# Patient Record
Sex: FEMALE | Race: WHITE | Employment: OTHER | URBAN - METROPOLITAN AREA
[De-identification: names, ages, dates, MRNs, and addresses within clinical notes are randomized per-mention and may not be internally consistent; named-entity substitution may affect disease eponyms.]

---

## 2018-12-13 ENCOUNTER — HOSPITAL ENCOUNTER (OUTPATIENT)
Dept: PREADMISSION TESTING | Age: 73
Discharge: HOME | End: 2018-12-13
Payer: MEDICARE

## 2018-12-13 LAB
ALBUMIN SERPL-MCNC: 4.2 G/DL (ref 3.4–5)
ALBUMIN/GLOB SERPL: 1.2 {RATIO} (ref 0.8–1.7)
ALP SERPL-CCNC: 131 U/L (ref 45–117)
ALT SERPL-CCNC: 32 U/L (ref 13–56)
ANION GAP SERPL CALC-SCNC: 8 MMOL/L (ref 3–18)
APPEARANCE UR: CLEAR
APTT PPP: 29.8 SEC (ref 23–36.4)
AST SERPL-CCNC: 24 U/L (ref 15–37)
ATRIAL RATE: 55 BPM
BACTERIA SPEC CULT: NORMAL
BACTERIA URNS QL MICRO: ABNORMAL /HPF
BASOPHILS # BLD: 0 K/UL (ref 0–0.1)
BASOPHILS NFR BLD: 0 % (ref 0–2)
BILIRUB SERPL-MCNC: 0.4 MG/DL (ref 0.2–1)
BILIRUB UR QL: NEGATIVE
BUN SERPL-MCNC: 20 MG/DL (ref 7–18)
BUN/CREAT SERPL: 22
CALCIUM SERPL-MCNC: 8.8 MG/DL (ref 8.5–10.1)
CALCULATED P AXIS, ECG09: 72 DEGREES
CALCULATED R AXIS, ECG10: 25 DEGREES
CALCULATED T AXIS, ECG11: 37 DEGREES
CHLORIDE SERPL-SCNC: 105 MMOL/L (ref 100–108)
CO2 SERPL-SCNC: 27 MMOL/L (ref 21–32)
COLOR UR: YELLOW
CREAT SERPL-MCNC: 0.89 MG/DL (ref 0.6–1.3)
DIAGNOSIS, 93000: NORMAL
DIFFERENTIAL METHOD BLD: ABNORMAL
EOSINOPHIL # BLD: 0.3 K/UL (ref 0–0.4)
EOSINOPHIL NFR BLD: 2 % (ref 0–5)
EPITH CASTS URNS QL MICRO: ABNORMAL /LPF (ref 0–5)
ERYTHROCYTE [DISTWIDTH] IN BLOOD BY AUTOMATED COUNT: 14.5 % (ref 11.6–14.5)
ERYTHROCYTE [SEDIMENTATION RATE] IN BLOOD: 22 MM/HR (ref 0–30)
EST. AVERAGE GLUCOSE BLD GHB EST-MCNC: 134 MG/DL
GLOBULIN SER CALC-MCNC: 3.6 G/DL (ref 2–4)
GLUCOSE SERPL-MCNC: 64 MG/DL (ref 74–99)
GLUCOSE UR STRIP.AUTO-MCNC: NEGATIVE MG/DL
HBA1C MFR BLD: 6.3 % (ref 4.2–5.6)
HCT VFR BLD AUTO: 37.7 % (ref 35–45)
HGB BLD-MCNC: 12.5 G/DL (ref 12–16)
HGB UR QL STRIP: NEGATIVE
INR PPP: 1 (ref 0.8–1.2)
KETONES UR QL STRIP.AUTO: NEGATIVE MG/DL
LEUKOCYTE ESTERASE UR QL STRIP.AUTO: ABNORMAL
LYMPHOCYTES # BLD: 4.8 K/UL (ref 0.9–3.6)
LYMPHOCYTES NFR BLD: 37 % (ref 21–52)
MCH RBC QN AUTO: 29.7 PG (ref 24–34)
MCHC RBC AUTO-ENTMCNC: 33.2 G/DL (ref 31–37)
MCV RBC AUTO: 89.5 FL (ref 74–97)
MONOCYTES # BLD: 0.9 K/UL (ref 0.05–1.2)
MONOCYTES NFR BLD: 7 % (ref 3–10)
NEUTS SEG # BLD: 7.1 K/UL (ref 1.8–8)
NEUTS SEG NFR BLD: 54 % (ref 40–73)
NITRITE UR QL STRIP.AUTO: NEGATIVE
P-R INTERVAL, ECG05: 142 MS
PH UR STRIP: 7 [PH] (ref 5–8)
PLATELET # BLD AUTO: 306 K/UL (ref 135–420)
PMV BLD AUTO: 10 FL (ref 9.2–11.8)
POTASSIUM SERPL-SCNC: 3.9 MMOL/L (ref 3.5–5.5)
PROT SERPL-MCNC: 7.8 G/DL (ref 6.4–8.2)
PROT UR STRIP-MCNC: NEGATIVE MG/DL
PROTHROMBIN TIME: 12.5 SEC (ref 11.5–15.2)
Q-T INTERVAL, ECG07: 472 MS
QRS DURATION, ECG06: 94 MS
QTC CALCULATION (BEZET), ECG08: 451 MS
RBC # BLD AUTO: 4.21 M/UL (ref 4.2–5.3)
RBC #/AREA URNS HPF: NEGATIVE /HPF (ref 0–5)
SERVICE CMNT-IMP: NORMAL
SODIUM SERPL-SCNC: 140 MMOL/L (ref 136–145)
SP GR UR REFRACTOMETRY: 1.01 (ref 1–1.03)
UROBILINOGEN UR QL STRIP.AUTO: 0.2 EU/DL (ref 0.2–1)
VENTRICULAR RATE, ECG03: 55 BPM
WBC # BLD AUTO: 13 K/UL (ref 4.6–13.2)
WBC URNS QL MICRO: ABNORMAL /HPF (ref 0–5)

## 2018-12-13 PROCEDURE — 87641 MR-STAPH DNA AMP PROBE: CPT

## 2018-12-13 PROCEDURE — 80053 COMPREHEN METABOLIC PANEL: CPT

## 2018-12-13 PROCEDURE — 85652 RBC SED RATE AUTOMATED: CPT

## 2018-12-13 PROCEDURE — 81001 URINALYSIS AUTO W/SCOPE: CPT

## 2018-12-13 PROCEDURE — 85025 COMPLETE CBC W/AUTO DIFF WBC: CPT

## 2018-12-13 PROCEDURE — 83036 HEMOGLOBIN GLYCOSYLATED A1C: CPT

## 2018-12-13 PROCEDURE — 93005 ELECTROCARDIOGRAM TRACING: CPT

## 2018-12-13 PROCEDURE — 85610 PROTHROMBIN TIME: CPT

## 2018-12-13 PROCEDURE — 36415 COLL VENOUS BLD VENIPUNCTURE: CPT

## 2018-12-13 PROCEDURE — 85730 THROMBOPLASTIN TIME PARTIAL: CPT

## 2018-12-13 NOTE — ROUTINE PROCESS
Kenyon Damian attended the Joint Replacement Pre-Operative class on 12.13.2018. Topics discussed included surgery preparation, what to expect the day of surgery, medications, nutrition, glycemic control, respiratory therapy, physical and occupational therapy, and discharge planning. A patient education notebook was provided and the opportunity was given to ask questions and phone number of the Orthopaedic  was given for any future questions or concerns. [place]

## 2019-01-06 ENCOUNTER — ANESTHESIA EVENT (OUTPATIENT)
Dept: SURGERY | Age: 74
DRG: 470 | End: 2019-01-06
Payer: MEDICARE

## 2019-01-06 PROBLEM — M16.11 LOCALIZED OSTEOARTHROSIS OF RIGHT HIP: Chronic | Status: ACTIVE | Noted: 2019-01-06

## 2019-01-06 NOTE — H&P
[address] History and Physical Exam 
 
Patient: Kenyon Damian MRN: [id number]  SSN: [id number] YOB: 1945  Age: 74 y.o. Sex: female Subjective: Chief Complaint: right hip pain History of Present Illness:  Patient complains of right hip pain and difficulty ambulating, which has progressed over the past several months. X-rays showed osteoarthritis of the joint. The patient's pain has persisted and progressed despite conservative treatments and therapies. The patient has been previously treated with NSAIDS. The patient has at this time opted for surgical intervention. Past Medical History:  
Diagnosis Date  Adverse effect of anesthesia   
 difficult to wake up  Hypertension   Localized osteoarthrosis of right hip 2019  Neuropathy   
 toes  OA (osteoarthritis)  Thyroid disease   
 hypo Past Surgical History:  
Procedure Laterality Date  HX GI  before   
 colonoscopy  HX GI    
 ERCP  
 HX LAP CHOLECYSTECTOMY  HX TONSILLECTOMY  HX TUBAL LIGATION Bilateral   
 
Social History Occupational History  Not on file Tobacco Use  Smoking status: Former Smoker Last attempt to quit: 1970 Years since quittin.0  Smokeless tobacco: Never Used Substance and Sexual Activity  Alcohol use: No  
  Frequency: Never  Drug use: No  
 Sexual activity: Not on file Prior to Admission medications Medication Sig Start Date End Date Taking? Authorizing Provider  
levothyroxine (SYNTHROID) 137 mcg tablet Take  by mouth Daily (before breakfast). Provider, Historical  
diltiazem (TIAZAC) 240 mg SR capsule Take 240 mg by mouth daily. Provider, Historical  
losartan (COZAAR) 50 mg tablet Take 50 mg by mouth daily. Provider, Historical  
multivitamin (ONE A DAY) tablet Take 1 Tab by mouth daily. Provider, Historical  
 
 
Allergies: Allergies Allergen Reactions  Pcn [Penicillins] Anaphylaxis 2011  Shellfish Derived Anaphylaxis 2011  Sulfa (Sulfonamide Antibiotics) Itching Review of Systems: 
Pertinent items are noted in the History of Present Illness. Objective:   
  
Physical Exam: 
HEENT: Normocephalic, atraumatic Lungs:  Clear to auscultation Heart:   Regular rate and rhythm Abdomen: Soft Extremities:  Pain with range of motion of the right hip. Passive flexion  degrees, 
                     passive internal rotation 0-10 degrees, with pain throughout ROM,  
                     passive external rotation 10-20 degrees with pain at the arc of motion. Antalgic gait noted. Assessment:   
 
Arthritis of the right hip. Plan:    
 
Proceed with scheduled RIGHT TOTAL HIP ARTHROPLASTY. The various methods of treatment have been discussed with the patient and family. After consideration of risks, benefits, and other options for treatment, the patient has consented to surgical interventions. Questions were answered and preoperative teaching was done by Dr Delilah Green. Signed By: KAREN Espinal   
 January 6, 2019

## 2019-01-07 ENCOUNTER — HOSPITAL ENCOUNTER (INPATIENT)
Age: 74
LOS: 1 days | Discharge: HOME HEALTH | DRG: 470 | End: 2019-01-08
Attending: ORTHOPAEDIC SURGERY | Admitting: ORTHOPAEDIC SURGERY
Payer: MEDICARE

## 2019-01-07 ENCOUNTER — APPOINTMENT (OUTPATIENT)
Dept: GENERAL RADIOLOGY | Age: 74
DRG: 470 | End: 2019-01-07
Attending: ORTHOPAEDIC SURGERY
Payer: MEDICARE

## 2019-01-07 ENCOUNTER — APPOINTMENT (OUTPATIENT)
Dept: GENERAL RADIOLOGY | Age: 74
DRG: 470 | End: 2019-01-07
Attending: PHYSICIAN ASSISTANT
Payer: MEDICARE

## 2019-01-07 ENCOUNTER — ANESTHESIA (OUTPATIENT)
Dept: SURGERY | Age: 74
DRG: 470 | End: 2019-01-07
Payer: MEDICARE

## 2019-01-07 DIAGNOSIS — M16.11 PRIMARY OSTEOARTHRITIS OF RIGHT HIP: Primary | ICD-10-CM

## 2019-01-07 LAB
ABO + RH BLD: NORMAL
APPEARANCE UR: CLEAR
BACTERIA URNS QL MICRO: ABNORMAL /HPF
BILIRUB UR QL: NEGATIVE
BLOOD GROUP ANTIBODIES SERPL: NORMAL
CAOX CRY URNS QL MICRO: ABNORMAL
COLOR UR: YELLOW
EPITH CASTS URNS QL MICRO: ABNORMAL /LPF (ref 0–5)
GLUCOSE BLD STRIP.AUTO-MCNC: 101 MG/DL (ref 70–110)
GLUCOSE UR STRIP.AUTO-MCNC: NEGATIVE MG/DL
HGB UR QL STRIP: NEGATIVE
KETONES UR QL STRIP.AUTO: NEGATIVE MG/DL
LEUKOCYTE ESTERASE UR QL STRIP.AUTO: ABNORMAL
NITRITE UR QL STRIP.AUTO: NEGATIVE
PH UR STRIP: 6 [PH] (ref 5–8)
PROT UR STRIP-MCNC: NEGATIVE MG/DL
RBC #/AREA URNS HPF: ABNORMAL /HPF (ref 0–5)
SP GR UR REFRACTOMETRY: 1.02 (ref 1–1.03)
SPECIMEN EXP DATE BLD: NORMAL
UROBILINOGEN UR QL STRIP.AUTO: 0.2 EU/DL (ref 0.2–1)
WBC URNS QL MICRO: ABNORMAL /HPF (ref 0–5)

## 2019-01-07 PROCEDURE — 77030031139 HC SUT VCRL2 J&J -A: Performed by: ORTHOPAEDIC SURGERY

## 2019-01-07 PROCEDURE — 77030002934 HC SUT MCRYL J&J -B: Performed by: ORTHOPAEDIC SURGERY

## 2019-01-07 PROCEDURE — 36415 COLL VENOUS BLD VENIPUNCTURE: CPT

## 2019-01-07 PROCEDURE — 82962 GLUCOSE BLOOD TEST: CPT

## 2019-01-07 PROCEDURE — 81001 URINALYSIS AUTO W/SCOPE: CPT

## 2019-01-07 PROCEDURE — 74011000250 HC RX REV CODE- 250

## 2019-01-07 PROCEDURE — 77030013708 HC HNDPC SUC IRR PULS STRY –B: Performed by: ORTHOPAEDIC SURGERY

## 2019-01-07 PROCEDURE — 77030032490 HC SLV COMPR SCD KNE COVD -B: Performed by: ORTHOPAEDIC SURGERY

## 2019-01-07 PROCEDURE — 77030020782 HC GWN BAIR PAWS FLX 3M -B: Performed by: ORTHOPAEDIC SURGERY

## 2019-01-07 PROCEDURE — 74011250637 HC RX REV CODE- 250/637: Performed by: ANESTHESIOLOGY

## 2019-01-07 PROCEDURE — 76060000033 HC ANESTHESIA 1 TO 1.5 HR: Performed by: ORTHOPAEDIC SURGERY

## 2019-01-07 PROCEDURE — 74011250637 HC RX REV CODE- 250/637: Performed by: PHYSICIAN ASSISTANT

## 2019-01-07 PROCEDURE — 77030012890

## 2019-01-07 PROCEDURE — 77030034694 HC SCPL CANADY PLSM DISP USMD -E: Performed by: ORTHOPAEDIC SURGERY

## 2019-01-07 PROCEDURE — 97116 GAIT TRAINING THERAPY: CPT

## 2019-01-07 PROCEDURE — 65270000029 HC RM PRIVATE

## 2019-01-07 PROCEDURE — 77030038010: Performed by: ORTHOPAEDIC SURGERY

## 2019-01-07 PROCEDURE — 74011250636 HC RX REV CODE- 250/636

## 2019-01-07 PROCEDURE — C1776 JOINT DEVICE (IMPLANTABLE): HCPCS | Performed by: ORTHOPAEDIC SURGERY

## 2019-01-07 PROCEDURE — 74011250637 HC RX REV CODE- 250/637: Performed by: ORTHOPAEDIC SURGERY

## 2019-01-07 PROCEDURE — 74011250636 HC RX REV CODE- 250/636: Performed by: ORTHOPAEDIC SURGERY

## 2019-01-07 PROCEDURE — 76010000149 HC OR TIME 1 TO 1.5 HR: Performed by: ORTHOPAEDIC SURGERY

## 2019-01-07 PROCEDURE — 77030027138 HC INCENT SPIROMETER -A: Performed by: ORTHOPAEDIC SURGERY

## 2019-01-07 PROCEDURE — 74011000250 HC RX REV CODE- 250: Performed by: ORTHOPAEDIC SURGERY

## 2019-01-07 PROCEDURE — 77030003666 HC NDL SPINAL BD -A: Performed by: ORTHOPAEDIC SURGERY

## 2019-01-07 PROCEDURE — 0SR904A REPLACEMENT OF RIGHT HIP JOINT WITH CERAMIC ON POLYETHYLENE SYNTHETIC SUBSTITUTE, UNCEMENTED, OPEN APPROACH: ICD-10-PCS | Performed by: ORTHOPAEDIC SURGERY

## 2019-01-07 PROCEDURE — 76210000016 HC OR PH I REC 1 TO 1.5 HR: Performed by: ORTHOPAEDIC SURGERY

## 2019-01-07 PROCEDURE — 86900 BLOOD TYPING SEROLOGIC ABO: CPT

## 2019-01-07 PROCEDURE — 74011250636 HC RX REV CODE- 250/636: Performed by: PHYSICIAN ASSISTANT

## 2019-01-07 PROCEDURE — 77030033263 HC DRSG MEPILEX 16-48IN BORD MOLN -B: Performed by: ORTHOPAEDIC SURGERY

## 2019-01-07 PROCEDURE — 77030039267 HC ADH SKN EXOFIN S2SG -B: Performed by: ORTHOPAEDIC SURGERY

## 2019-01-07 PROCEDURE — 77030018836 HC SOL IRR NACL ICUM -A: Performed by: ORTHOPAEDIC SURGERY

## 2019-01-07 PROCEDURE — 73501 X-RAY EXAM HIP UNI 1 VIEW: CPT

## 2019-01-07 PROCEDURE — 97161 PT EVAL LOW COMPLEX 20 MIN: CPT

## 2019-01-07 DEVICE — STEM FEM PRSS FT HIP HRD SURF: Type: IMPLANTABLE DEVICE | Site: HIP | Status: FUNCTIONAL

## 2019-01-07 RX ORDER — ONDANSETRON 2 MG/ML
INJECTION INTRAMUSCULAR; INTRAVENOUS AS NEEDED
Status: DISCONTINUED | OUTPATIENT
Start: 2019-01-07 | End: 2019-01-07 | Stop reason: HOSPADM

## 2019-01-07 RX ORDER — SODIUM CHLORIDE, SODIUM LACTATE, POTASSIUM CHLORIDE, CALCIUM CHLORIDE 600; 310; 30; 20 MG/100ML; MG/100ML; MG/100ML; MG/100ML
125 INJECTION, SOLUTION INTRAVENOUS CONTINUOUS
Status: DISCONTINUED | OUTPATIENT
Start: 2019-01-07 | End: 2019-01-08 | Stop reason: HOSPADM

## 2019-01-07 RX ORDER — DOCUSATE SODIUM 100 MG/1
100 CAPSULE, LIQUID FILLED ORAL DAILY
Status: DISCONTINUED | OUTPATIENT
Start: 2019-01-08 | End: 2019-01-08 | Stop reason: HOSPADM

## 2019-01-07 RX ORDER — ASPIRIN 81 MG/1
81 TABLET ORAL 2 TIMES DAILY
Status: DISCONTINUED | OUTPATIENT
Start: 2019-01-07 | End: 2019-01-08 | Stop reason: HOSPADM

## 2019-01-07 RX ORDER — MELOXICAM 7.5 MG/1
7.5 TABLET ORAL 2 TIMES DAILY
Qty: 28 TAB | Refills: 0 | Status: SHIPPED | OUTPATIENT
Start: 2019-01-07 | End: 2019-01-21

## 2019-01-07 RX ORDER — ZOLPIDEM TARTRATE 5 MG/1
5-10 TABLET ORAL
Status: DISCONTINUED | OUTPATIENT
Start: 2019-01-07 | End: 2019-01-08 | Stop reason: HOSPADM

## 2019-01-07 RX ORDER — OXYCODONE HYDROCHLORIDE 5 MG/1
5-10 TABLET ORAL
Status: DISCONTINUED | OUTPATIENT
Start: 2019-01-07 | End: 2019-01-08 | Stop reason: HOSPADM

## 2019-01-07 RX ORDER — MIDAZOLAM HYDROCHLORIDE 1 MG/ML
INJECTION, SOLUTION INTRAMUSCULAR; INTRAVENOUS AS NEEDED
Status: DISCONTINUED | OUTPATIENT
Start: 2019-01-07 | End: 2019-01-07 | Stop reason: HOSPADM

## 2019-01-07 RX ORDER — LIDOCAINE HYDROCHLORIDE 20 MG/ML
INJECTION, SOLUTION EPIDURAL; INFILTRATION; INTRACAUDAL; PERINEURAL AS NEEDED
Status: DISCONTINUED | OUTPATIENT
Start: 2019-01-07 | End: 2019-01-07 | Stop reason: HOSPADM

## 2019-01-07 RX ORDER — PANTOPRAZOLE SODIUM 40 MG/1
40 TABLET, DELAYED RELEASE ORAL DAILY
Status: DISCONTINUED | OUTPATIENT
Start: 2019-01-07 | End: 2019-01-08 | Stop reason: HOSPADM

## 2019-01-07 RX ORDER — DEXAMETHASONE SODIUM PHOSPHATE 4 MG/ML
4 INJECTION, SOLUTION INTRA-ARTICULAR; INTRALESIONAL; INTRAMUSCULAR; INTRAVENOUS; SOFT TISSUE ONCE
Status: COMPLETED | OUTPATIENT
Start: 2019-01-07 | End: 2019-01-07

## 2019-01-07 RX ORDER — DILTIAZEM HYDROCHLORIDE 240 MG/1
240 CAPSULE, COATED, EXTENDED RELEASE ORAL DAILY
Status: DISCONTINUED | OUTPATIENT
Start: 2019-01-08 | End: 2019-01-08 | Stop reason: HOSPADM

## 2019-01-07 RX ORDER — SODIUM CHLORIDE, SODIUM LACTATE, POTASSIUM CHLORIDE, CALCIUM CHLORIDE 600; 310; 30; 20 MG/100ML; MG/100ML; MG/100ML; MG/100ML
1000 INJECTION, SOLUTION INTRAVENOUS CONTINUOUS
Status: DISCONTINUED | OUTPATIENT
Start: 2019-01-07 | End: 2019-01-07 | Stop reason: HOSPADM

## 2019-01-07 RX ORDER — ASPIRIN 81 MG/1
81 TABLET ORAL 2 TIMES DAILY
Qty: 42 TAB | Refills: 0 | Status: SHIPPED | OUTPATIENT
Start: 2019-01-07 | End: 2019-01-07 | Stop reason: SDUPTHER

## 2019-01-07 RX ORDER — PANTOPRAZOLE SODIUM 40 MG/1
40 TABLET, DELAYED RELEASE ORAL DAILY
Status: DISCONTINUED | OUTPATIENT
Start: 2019-01-07 | End: 2019-01-07

## 2019-01-07 RX ORDER — OXYCODONE AND ACETAMINOPHEN 5; 325 MG/1; MG/1
TABLET ORAL
Qty: 60 TAB | Refills: 0 | Status: SHIPPED | OUTPATIENT
Start: 2019-01-07

## 2019-01-07 RX ORDER — TRANEXAMIC ACID 650 1/1
1950 TABLET ORAL ONCE
Status: COMPLETED | OUTPATIENT
Start: 2019-01-07 | End: 2019-01-07

## 2019-01-07 RX ORDER — NALOXONE HYDROCHLORIDE 0.4 MG/ML
0.2 INJECTION, SOLUTION INTRAMUSCULAR; INTRAVENOUS; SUBCUTANEOUS AS NEEDED
Status: DISCONTINUED | OUTPATIENT
Start: 2019-01-07 | End: 2019-01-07 | Stop reason: HOSPADM

## 2019-01-07 RX ORDER — SODIUM CHLORIDE 9 MG/ML
125 INJECTION, SOLUTION INTRAVENOUS CONTINUOUS
Status: DISPENSED | OUTPATIENT
Start: 2019-01-07 | End: 2019-01-08

## 2019-01-07 RX ORDER — ACETAMINOPHEN 325 MG/1
650 TABLET ORAL EVERY 6 HOURS
Status: DISCONTINUED | OUTPATIENT
Start: 2019-01-07 | End: 2019-01-08 | Stop reason: HOSPADM

## 2019-01-07 RX ORDER — ACETAMINOPHEN 500 MG
1000 TABLET ORAL
Status: COMPLETED | OUTPATIENT
Start: 2019-01-07 | End: 2019-01-07

## 2019-01-07 RX ORDER — SODIUM CHLORIDE 0.9 % (FLUSH) 0.9 %
5-10 SYRINGE (ML) INJECTION EVERY 8 HOURS
Status: DISCONTINUED | OUTPATIENT
Start: 2019-01-07 | End: 2019-01-08 | Stop reason: HOSPADM

## 2019-01-07 RX ORDER — CEFAZOLIN SODIUM/WATER 2 G/20 ML
2 SYRINGE (ML) INTRAVENOUS EVERY 8 HOURS
Status: COMPLETED | OUTPATIENT
Start: 2019-01-07 | End: 2019-01-08

## 2019-01-07 RX ORDER — DIPHENHYDRAMINE HYDROCHLORIDE 50 MG/ML
12.5 INJECTION, SOLUTION INTRAMUSCULAR; INTRAVENOUS
Status: DISCONTINUED | OUTPATIENT
Start: 2019-01-07 | End: 2019-01-07 | Stop reason: HOSPADM

## 2019-01-07 RX ORDER — KETAMINE HYDROCHLORIDE 10 MG/ML
INJECTION, SOLUTION INTRAMUSCULAR; INTRAVENOUS AS NEEDED
Status: DISCONTINUED | OUTPATIENT
Start: 2019-01-07 | End: 2019-01-07 | Stop reason: HOSPADM

## 2019-01-07 RX ORDER — DEXAMETHASONE SODIUM PHOSPHATE 4 MG/ML
8 INJECTION, SOLUTION INTRA-ARTICULAR; INTRALESIONAL; INTRAMUSCULAR; INTRAVENOUS; SOFT TISSUE ONCE
Status: DISCONTINUED | OUTPATIENT
Start: 2019-01-07 | End: 2019-01-07

## 2019-01-07 RX ORDER — ASPIRIN 81 MG/1
81 TABLET ORAL 2 TIMES DAILY
Qty: 42 TAB | Refills: 0 | Status: SHIPPED | OUTPATIENT
Start: 2019-01-07 | End: 2019-01-28

## 2019-01-07 RX ORDER — CEFAZOLIN SODIUM/WATER 2 G/20 ML
2 SYRINGE (ML) INTRAVENOUS ONCE
Status: COMPLETED | OUTPATIENT
Start: 2019-01-07 | End: 2019-01-07

## 2019-01-07 RX ORDER — KETOROLAC TROMETHAMINE 30 MG/ML
15 INJECTION, SOLUTION INTRAMUSCULAR; INTRAVENOUS EVERY 6 HOURS
Status: COMPLETED | OUTPATIENT
Start: 2019-01-07 | End: 2019-01-08

## 2019-01-07 RX ORDER — LOSARTAN POTASSIUM 50 MG/1
50 TABLET ORAL DAILY
Status: DISCONTINUED | OUTPATIENT
Start: 2019-01-08 | End: 2019-01-08 | Stop reason: HOSPADM

## 2019-01-07 RX ORDER — ONDANSETRON 2 MG/ML
4 INJECTION INTRAMUSCULAR; INTRAVENOUS
Status: DISCONTINUED | OUTPATIENT
Start: 2019-01-07 | End: 2019-01-08 | Stop reason: HOSPADM

## 2019-01-07 RX ORDER — DIPHENHYDRAMINE HYDROCHLORIDE 50 MG/ML
12.5 INJECTION, SOLUTION INTRAMUSCULAR; INTRAVENOUS
Status: DISCONTINUED | OUTPATIENT
Start: 2019-01-07 | End: 2019-01-08 | Stop reason: HOSPADM

## 2019-01-07 RX ORDER — SODIUM CHLORIDE 9 MG/ML
300 INJECTION, SOLUTION INTRAVENOUS CONTINUOUS
Status: DISPENSED | OUTPATIENT
Start: 2019-01-07 | End: 2019-01-07

## 2019-01-07 RX ORDER — VANCOMYCIN/0.9 % SOD CHLORIDE 1.5G/250ML
1500 PLASTIC BAG, INJECTION (ML) INTRAVENOUS ONCE
Status: DISCONTINUED | OUTPATIENT
Start: 2019-01-07 | End: 2019-01-07 | Stop reason: CLARIF

## 2019-01-07 RX ORDER — FENTANYL CITRATE 50 UG/ML
25 INJECTION, SOLUTION INTRAMUSCULAR; INTRAVENOUS AS NEEDED
Status: DISCONTINUED | OUTPATIENT
Start: 2019-01-07 | End: 2019-01-07 | Stop reason: HOSPADM

## 2019-01-07 RX ORDER — NALOXONE HYDROCHLORIDE 0.4 MG/ML
0.4 INJECTION, SOLUTION INTRAMUSCULAR; INTRAVENOUS; SUBCUTANEOUS AS NEEDED
Status: DISCONTINUED | OUTPATIENT
Start: 2019-01-07 | End: 2019-01-08 | Stop reason: HOSPADM

## 2019-01-07 RX ORDER — ALBUTEROL SULFATE 0.83 MG/ML
2.5 SOLUTION RESPIRATORY (INHALATION) AS NEEDED
Status: DISCONTINUED | OUTPATIENT
Start: 2019-01-07 | End: 2019-01-07 | Stop reason: HOSPADM

## 2019-01-07 RX ORDER — SODIUM CHLORIDE 0.9 % (FLUSH) 0.9 %
5-10 SYRINGE (ML) INJECTION AS NEEDED
Status: DISCONTINUED | OUTPATIENT
Start: 2019-01-07 | End: 2019-01-08 | Stop reason: HOSPADM

## 2019-01-07 RX ORDER — METOCLOPRAMIDE HYDROCHLORIDE 5 MG/ML
10 INJECTION INTRAMUSCULAR; INTRAVENOUS
Status: DISCONTINUED | OUTPATIENT
Start: 2019-01-07 | End: 2019-01-08 | Stop reason: HOSPADM

## 2019-01-07 RX ORDER — PROPOFOL 10 MG/ML
INJECTION, EMULSION INTRAVENOUS AS NEEDED
Status: DISCONTINUED | OUTPATIENT
Start: 2019-01-07 | End: 2019-01-07 | Stop reason: HOSPADM

## 2019-01-07 RX ORDER — HYDROMORPHONE HYDROCHLORIDE 2 MG/ML
0.5 INJECTION, SOLUTION INTRAMUSCULAR; INTRAVENOUS; SUBCUTANEOUS
Status: DISCONTINUED | OUTPATIENT
Start: 2019-01-07 | End: 2019-01-07 | Stop reason: HOSPADM

## 2019-01-07 RX ORDER — FLUMAZENIL 0.1 MG/ML
0.2 INJECTION INTRAVENOUS
Status: DISCONTINUED | OUTPATIENT
Start: 2019-01-07 | End: 2019-01-07 | Stop reason: HOSPADM

## 2019-01-07 RX ORDER — LANOLIN ALCOHOL/MO/W.PET/CERES
1 CREAM (GRAM) TOPICAL 3 TIMES DAILY
Status: DISCONTINUED | OUTPATIENT
Start: 2019-01-07 | End: 2019-01-08 | Stop reason: HOSPADM

## 2019-01-07 RX ADMIN — OXYCODONE HYDROCHLORIDE 10 MG: 5 TABLET ORAL at 10:46

## 2019-01-07 RX ADMIN — OXYCODONE HYDROCHLORIDE 5 MG: 5 TABLET ORAL at 19:00

## 2019-01-07 RX ADMIN — PROPOFOL 180 MG: 10 INJECTION, EMULSION INTRAVENOUS at 09:18

## 2019-01-07 RX ADMIN — SODIUM CHLORIDE, SODIUM LACTATE, POTASSIUM CHLORIDE, AND CALCIUM CHLORIDE 125 ML/HR: 600; 310; 30; 20 INJECTION, SOLUTION INTRAVENOUS at 07:26

## 2019-01-07 RX ADMIN — KETOROLAC TROMETHAMINE 15 MG: 30 INJECTION, SOLUTION INTRAMUSCULAR at 17:44

## 2019-01-07 RX ADMIN — PANTOPRAZOLE SODIUM 40 MG: 40 TABLET, DELAYED RELEASE ORAL at 08:06

## 2019-01-07 RX ADMIN — KETOROLAC TROMETHAMINE 15 MG: 30 INJECTION, SOLUTION INTRAMUSCULAR at 23:18

## 2019-01-07 RX ADMIN — KETAMINE HYDROCHLORIDE 20 MG: 10 INJECTION, SOLUTION INTRAMUSCULAR; INTRAVENOUS at 09:46

## 2019-01-07 RX ADMIN — MIDAZOLAM HYDROCHLORIDE 2 MG: 1 INJECTION, SOLUTION INTRAMUSCULAR; INTRAVENOUS at 09:11

## 2019-01-07 RX ADMIN — TRANEXAMIC ACID 1950 MG: 650 TABLET ORAL at 07:27

## 2019-01-07 RX ADMIN — SODIUM CHLORIDE 300 ML/HR: 900 INJECTION, SOLUTION INTRAVENOUS at 12:29

## 2019-01-07 RX ADMIN — FERROUS SULFATE TAB 325 MG (65 MG ELEMENTAL FE) 325 MG: 325 (65 FE) TAB at 15:26

## 2019-01-07 RX ADMIN — ASPIRIN 81 MG: 81 TABLET, COATED ORAL at 22:25

## 2019-01-07 RX ADMIN — ONDANSETRON 4 MG: 2 INJECTION INTRAMUSCULAR; INTRAVENOUS at 09:42

## 2019-01-07 RX ADMIN — Medication 2 G: at 17:06

## 2019-01-07 RX ADMIN — ASPIRIN 81 MG: 81 TABLET, COATED ORAL at 12:40

## 2019-01-07 RX ADMIN — SODIUM CHLORIDE 125 ML/HR: 900 INJECTION, SOLUTION INTRAVENOUS at 15:30

## 2019-01-07 RX ADMIN — ACETAMINOPHEN 650 MG: 325 TABLET ORAL at 23:18

## 2019-01-07 RX ADMIN — ACETAMINOPHEN 650 MG: 325 TABLET ORAL at 12:40

## 2019-01-07 RX ADMIN — Medication 2 G: at 09:11

## 2019-01-07 RX ADMIN — ACETAMINOPHEN 650 MG: 325 TABLET ORAL at 17:44

## 2019-01-07 RX ADMIN — KETOROLAC TROMETHAMINE 15 MG: 30 INJECTION, SOLUTION INTRAMUSCULAR at 11:00

## 2019-01-07 RX ADMIN — FERROUS SULFATE TAB 325 MG (65 MG ELEMENTAL FE) 325 MG: 325 (65 FE) TAB at 22:25

## 2019-01-07 RX ADMIN — ACETAMINOPHEN 1000 MG: 500 TABLET ORAL at 08:06

## 2019-01-07 RX ADMIN — LIDOCAINE HYDROCHLORIDE 60 MG: 20 INJECTION, SOLUTION EPIDURAL; INFILTRATION; INTRACAUDAL; PERINEURAL at 09:18

## 2019-01-07 RX ADMIN — SODIUM CHLORIDE, SODIUM LACTATE, POTASSIUM CHLORIDE, AND CALCIUM CHLORIDE 1000 ML: 600; 310; 30; 20 INJECTION, SOLUTION INTRAVENOUS at 07:26

## 2019-01-07 RX ADMIN — KETAMINE HYDROCHLORIDE 30 MG: 10 INJECTION, SOLUTION INTRAMUSCULAR; INTRAVENOUS at 09:23

## 2019-01-07 RX ADMIN — DEXAMETHASONE SODIUM PHOSPHATE 4 MG: 4 INJECTION, SOLUTION INTRAMUSCULAR; INTRAVENOUS at 08:06

## 2019-01-07 RX ADMIN — SODIUM CHLORIDE 125 ML/HR: 900 INJECTION, SOLUTION INTRAVENOUS at 22:25

## 2019-01-07 NOTE — OP NOTES
[address] [facility] [address] Medicine  Total Hip Arthroplasty      Patient: Malissa Clark MRN: [id number]  SSN: [id number]    YOB: 1945  Age: 74 y.o. Sex: female      Date of Surgery: 1/7/2019   Preoperative Diagnosis: UNILATERAL PRIMARY OSTEOARTHRITIS, RIGHT HIP   Postoperative Diagnosis: UNILATERAL PRIMARY OSTEOARTHRITIS, RIGHT HIP   Location: [facility]  Surgeon: Taylor Noe MD  Assistant: Carole Katz PA-C    Anesthesia: general    Procedure: Total Right Hip Arthroplasty    Findings: Degenerative joint disease of the right hip. Estimated Blood Loss: 300ml    Specimens: None    Complications: none    Implants:   Implant Name Type Inv. Item Serial No.  Lot No. LRB No. Used Action   ACETABULAR LINER    [employer]TH[employer]ELOPMENT [id number] Right 1 Implanted   ACETABULAR SHELL    ORTHO DEVELOPMENT [id number] Right 1 Implanted   HIP STEM    ORTHO DEVELOPMENT [id number] Right 1 Implanted   FEMORAL HEAD    ORTHO DEVELOPMENT [id number] Right 1 Implanted       Procedure Detail:  After the patient was brought to the operating suite, She was effectively anesthetized using general anesthesia, then transferred to the [place] table and secured in a standard fashion. Her right hip was then prepped and draped in a normal sterile orthopedic fashion. She was given appropriate intravenous antibiotics preoperatively. After a proper timeout was performed, a direct anterior approach to the hip was performed using a short Iqbal-Min interval. Anterior capsulotomy was performed. The degenerative changes of the hip were noted. Femoral neck osteotomy was then performed to the templated area. The head and neck were removed. The pulvinar and labrum were excised. The acetabulum was then reamed up to 52 mm with good bleeding cancellous bone obtained. The cup was then irrigated with pulse lavage system.  A 52 mm orthodevelopment Legend cup was then impacted in place with excellent stable fixation obtained, placing the cup at about 45 degrees of abduction, 20 degrees of anteversion. The liner was then impacted in place. A screw was not placed. Attention was turned to the femur, which was delivered into the wound with a combination of extension, external rotation, and adduction, and using the hook on the [place] table to deliver the femur into the wound. The canal was broached up to a size 14 for the Entrada stem system with excellent stable fixation obtained. A trial reduction was then performed with the standard neck offset and 36 mm head balls with various neck lengths. With the +0, she appeared to have equalization of leg lengths and restoration of offset radiographically, and excellent functional stability was noted. The trial broach was removed. The canal was irrigated with the pulse lavage system. The final components were impacted in place with excellent stable fixation obtained once again. The final reduction was performed and once again leg lengths and offset were restored radiographically, using the C-arm radiographically intraoperatively, and excellent functional stability was noted. The wound was then irrigated one more time, and then closed in layers. The fascia of the tensor was closed with #1 Vicryl in a running type stitch. Subcutaneous tissue was closed with 2-0 Vicryl in a simple buried stitch, and the skin was closed with Prineo. Dry, sterile dressing was then applied. She tolerated this well, was transferred to the bed, and taken to recovery room, extubated, in stable condition. All sponge and needle counts were correct.     Signed By: Carli Good MD     January 7, 2019

## 2019-01-07 NOTE — PERIOP NOTES
TRANSFER - OUT REPORT: 
 
Verbal report given to Awilda Montes RN(name) on Yassine Cary  being transferred to room 210(unit) for routine post - op Report consisted of patients Situation, Background, Assessment and  
Recommendations(SBAR). Information from the following report(s) SBAR was reviewed with the receiving nurse. Intake/Output Summary (Last 24 hours) at 1/7/2019 1142 Last data filed at 1/7/2019 1038 Gross per 24 hour Intake 1600 ml Output  Net 1600 ml Lines:  
Peripheral IV 01/07/19 Left Hand (Active) Site Assessment Clean, dry, & intact 1/7/2019 11:02 AM  
Phlebitis Assessment 0 1/7/2019 11:02 AM  
Infiltration Assessment 0 1/7/2019 11:02 AM  
Dressing Status Clean, dry, & intact 1/7/2019 11:02 AM  
Dressing Type Transparent 1/7/2019 11:02 AM  
Hub Color/Line Status Green; Infusing 1/7/2019 11:02 AM  
  
 
Opportunity for questions and clarification was provided. Patient transported with: 
 O2 @ 2 liters

## 2019-01-07 NOTE — DISCHARGE INSTRUCTIONS
[address] [place] [address] Sports Medicine   Patient Discharge Instructions    Natividad Alexander / [id number] : 1945    Admitted 2019 Discharged: 2019     IF YOU HAVE ANY PROBLEMS ONCE YOU ARE AT HOME CALL THE FOLLOWING NUMBERS:   Main office number: [phone]    Your follow up appointment to see either Dr. Samara Castañeda PAMARYANA, or [facility] PAMARYANA as scheduled in 2 weeks. If you are unsure of your appointment date call the office at [phone]. Medication Instructions     · Resume your home medictions as directed, you may have directed not to resume supplements until after your follow up. · A prescription for pain medication has been given   · It is important that you take the medication exactly as they are prescribed. · Keep your medication in the bottles provided by the pharmacist and keep a list of the medication names, dosages, and times to be taken in your wallet. · Do not take other medications without consulting your doctor. What to do at [address] [place] [address] Ave your prehospital diet. If you have excessive nausea or vomitting call your doctor's office. Be sure to maintain adequate fluid intake. Some pain medications may cause constipation. Remember to drink fluids, stay as active as possible, and eat plenty of fiber-rich foods. Begin In-Home Physical Therapy; 3 times a week to work on gait training, range of motion, strengthening, and weight bearing exercises as tolerable. Continue to use your walker or cane when walking. May progress from the walker to a cane to complete total bearing as tolerable. Patient may shower. Wrap incision with plastic wrap/covering to prevent incision from getting wet. Avoid complete immersion. YOUR DRESSING SHOULD BE CHANGED BY YOUR HOME HEALTH NURSE 3-5 DAYS AFTER SURGERY.           When to Call    - Call if you have a temperature greater then 101  - Unable to keep food down  - Are unable to bear any wieght   - Need a pain medication refill     Information obtained by :  I understand that if any problems occur once I am at home I am to contact my physician. I understand and acknowledge receipt of the instructions indicated above.                                                                                                                                            Physician's or R.N.'s Signature                                                                  Date/Time                                                                                                                                              Patient or Representative Signature                                                          Date/Time

## 2019-01-07 NOTE — INTERVAL H&P NOTE
H&P Update: 
Lizbeth Montes was seen and examined. History and physical has been reviewed. The patient has been examined.  There have been no significant clinical changes since the completion of the originally dated History and Physical. 
 
Signed By: Garrett Resendiz MD   
 January 7, 2019 7:09 AM

## 2019-01-07 NOTE — PROGRESS NOTES
Problem: Mobility Impaired (Adult and Pediatric) Goal: *Acute Goals and Plan of Care (Insert Text) Physical Therapy Goals Initiated 1/7/2019 and to be accomplished within 3-5 day(s) 1. Patient will move from supine <> sit with S in prep for out of bed activity and change of position. 2.  Patient will perform sit<> stand with S with LRAD in prep for transfers/ambulation. 3.  Patient will transfer from bed <> chair with S with LRAD for time up in chair for completion of ADL activity. 4.  Patient will ambulate 150 feet with LRAD/S for improved functional mobility/safe discharge. 5.  Patient will ascend/descend 3-5 stairs with minimal assistance/contact guard assist for home re-entry as needed. Outcome: Progressing Towards Goal 
physical Therapy EVALUATION Patient: Chloe Severino [de-identified]74 y.o. female) Date: 1/7/2019 Primary Diagnosis: UNILATERAL PRIMARY OSTEOARTHRITIS, RIGHT HIP Osteoarthritis of right hip Procedure(s) (LRB): 
RIGHT TOTAL HIP REPLACEMENT ANTERIOR APPROACH WITH C-ARM (Right) Day of Surgery Precautions:  Fall, WBAT 
 
ASSESSMENT : 
Based on the objective data described below, the patient presents with decrease independence w/ bed mobility, transfers, gait, and step negotiation. Pt seen in supine prior to session w/ supplemental O2, IV, BP, pulse ox, and B/L SCDs donned. Pt reported 7/10 pain at this time time. Pt has no c/o lightheadedness, dizziness or nausea. Pt's vitals assessed WNLs in supine,  however upon sitting at the EOB pt reported feeling lightheaded but sxs subsided with time. Pt able to ambulate a total of 45 ft w/ RW/GB and demonstrates a step to, antalgic gait, decrease tre, decrease stride length, and decrease step clearance. Pt transferred back to room secondary to pt reporting feeling lightheaded again, upon sitting at the EOB pt vomited in emesis bag, nurse made aware.  Pt was transferred back to supine in bed  after session, call bell and tray in reach, B/L SCDs donned, vitals assessed WNLs, nurse notified after session. Patient will benefit from skilled intervention to address the above impairments. Patients rehabilitation potential is considered to be Good Factors which may influence rehabilitation potential include:  
[]         None noted 
[]         Mental ability/status []         Medical condition 
[x]         Home/family situation and support systems 
[x]         Safety awareness [x]         Pain tolerance/management 
[]         Other: PLAN : 
Recommendations and Planned Interventions: 
[x]           Bed Mobility Training             [x]    Neuromuscular Re-Education 
[x]           Transfer Training                   []    Orthotic/Prosthetic Training 
[x]           Gait Training                          []    Modalities [x]           Therapeutic Exercises          []    Edema Management/Control 
[x]           Therapeutic Activities            [x]    Patient and Family Training/Education 
[]           Other (comment): Frequency/Duration: Patient will be followed by physical therapy twice daily to address goals. Discharge Recommendations: Home Health Further Equipment Recommendations for Discharge: N/A  
 
SUBJECTIVE:  
Patient stated I feel okay, just have some pain still.  OBJECTIVE DATA SUMMARY:  
 
Past Medical History:  
Diagnosis Date  Adverse effect of anesthesia   
 difficult to wake up  Hypertension 1993  Localized osteoarthrosis of right hip 1/6/2019  Neuropathy   
 toes  OA (osteoarthritis)  Thyroid disease   
 hypo Past Surgical History:  
Procedure Laterality Date  HX GI  before 2003  
 colonoscopy  HX GI    
 ERCP  
 HX LAP CHOLECYSTECTOMY  HX TONSILLECTOMY  HX TUBAL LIGATION Bilateral   
 
Barriers to Learning/Limitations: yes;  physical 
Compensate with: Verbal Cues and Tactile Cues Prior Level of Function/Home Situation:  
Home Situation Home Environment: Private residence # Steps to Enter: 2 Rails to Enter: No 
One/Two Story Residence: One story # of Interior Steps: 12 Height of Each Step (in): 8 inches Interior Rails: Right Lift Chair Available: No 
Living Alone: No 
Support Systems: Family member(s), Spouse/Significant Other/Partner Patient Expects to be Discharged to[de-identified] Private residence Current DME Used/Available at Home: Jonnie Kendra, rollingCritical Behavior: 
Neurologic State: Alert Orientation Level: Oriented X4 Psychosocial 
Purposeful Interaction: Yes Pt Identified Daily Priority: Clinical issues (comment) Caritas Process: Nurture loving kindness;Establish trust 
Caring Interventions: Reassure Reassure: Therapeutic listening; InformingSkin Condition/Temp: Dry;Flaky Skin Integrity: Incision (comment); Intact Skin Integumentary Skin Color: Appropriate for ethnicity Skin Condition/Temp: Dry;Flaky Skin Integrity: Incision (comment); Intact Turgor: Non-tenting Hair Growth: Present Strength:   
Strength: Generally decreased, functional 
Tone & Sensation:  
Tone: Normal 
Sensation: Intact Range Of Motion: 
AROM: Generally decreased, functional 
Functional Mobility: 
Bed Mobility: 
Supine to Sit: Minimum assistance Sit to Supine: Minimum assistance; Moderate assistance Scooting: Contact guard assistance Transfers: 
Sit to Stand: Contact guard assistance(Bed elevated for assistance) Stand to Sit: Contact guard assistance Balance:  
Sitting: Intact Standing: Intact; With supportAmbulation/Gait Training: 
Distance (ft): 45 Feet (ft) Assistive Device: Gait belt;Walker, rolling Ambulation - Level of Assistance: Contact guard assistance Gait Description (WDL): Exceptions to [facility] Gait Abnormalities: Antalgic;Decreased step clearance; Step to gait Right Side Weight Bearing: As tolerated Base of Support: Shift to left; Widened Stance: Right decreased Speed/Adrinaa: Slow Step Length: Left shortened;Right shortened Swing Pattern: Left asymmetrical;Right asymmetrical 
 
Therapeutic Exercises: Therex packet handed to pt upon assessment Pain: 
Pain Scale 1: Numeric (0 - 10) Pain Intensity 1: 5 Pain Location 1: Hip Pain Orientation 1: Right Pain Description 1: Aching Pain Intervention(s) 1: Cold pack Activity Tolerance:  
Fair Please refer to the flowsheet for vital signs taken during this treatment. After treatment:  
[]         Patient left in no apparent distress sitting up in chair 
[x]         Patient left in no apparent distress in bed 
[x]         Call bell left within reach [x]         Nursing notified 
[x]         Caregiver present (family) []         Bed alarm activated COMMUNICATION/EDUCATION:  
[x]         Fall prevention education was provided and the patient/caregiver indicated understanding. [x]         Patient/family have participated as able in goal setting and plan of care. [x]         Patient/family agree to work toward stated goals and plan of care. []         Patient understands intent and goals of therapy, but is neutral about his/her participation. []         Patient is unable to participate in goal setting and plan of care. Thank you for this referral. 
Kirill Lopez, PT Time Calculation: 34 mins Mobility: [id number] Current  CJ= 20-39%   Goal  CI= 1-19%. The severity rating is based on the Other Based on functional assessment

## 2019-01-07 NOTE — PERIOP NOTES
Verbal hand off at the bedside with Jacqueline Boeck provided opportunity for questions. Dual skin assessment performed and family member was updated and given room assignment.

## 2019-01-07 NOTE — DISCHARGE SUMMARY
[address] [address]     DISCHARGE SUMMARY     PATIENT: Carey Ortiz     MRN: [id number]   ADMIT DATE: 2019   [place]   DISCHARGE DATE:      ATTENDING: Lona Jacobs MD   DICTATING: KAREN Cadena     ADMISSION DIAGNOSIS: UNILATERAL PRIMARY OSTEOARTHRITIS, RIGHT HIP  Osteoarthritis of right hip    DISCHARGE DIAGNOSIS: Status post RIGHT TOTAL HIP ARTHROPLASTY    HISTORY OF PRESENT ILLNESS: The patient is a 74y.o. year-old female   with ongoing right hip pain secondary to osteoarthritis of right hip. The patient's pain has persisted and progressed despite conservative treatments and therapies. The patient has at this time opted for surgical intervention. PAST MEDICAL HISTORY:   Past Medical History:   Diagnosis Date    Adverse effect of anesthesia     difficult to wake up    Hypertension     Localized osteoarthrosis of right hip 2019    Neuropathy     toes    OA (osteoarthritis)     Thyroid disease     hypo       PAST SURGICAL HISTORY:   Past Surgical History:   Procedure Laterality Date    HX GI  before     colonoscopy    HX GI      ERCP    HX LAP CHOLECYSTECTOMY      HX TONSILLECTOMY      HX TUBAL LIGATION Bilateral        ALLERGIES:   Allergies   Allergen Reactions    Pcn [Penicillins] Anaphylaxis     45 years ago    Shellfish Derived Anaphylaxis     2011 - Pt. Thinks can use topical betadine without any issues.  Codeine Itching    Sulfa (Sulfonamide Antibiotics) Itching        CURRENT MEDICATIONS:  A list of medications prior to the time of admission include:  Prior to Admission medications    Medication Sig Start Date End Date Taking? Authorizing Provider   oxyCODONE-acetaminophen (PERCOCET) 5-325 mg per tablet Take 1-2 tablets by mouth every 4-6 hours as needed for pain.  19  Yes Qiana Pavon PA   meloxicam (MOBIC) 7.5 mg tablet Take 1 Tab by mouth two (2) times a day for 14 days. 19 Yes Sebastian Pavon PA   aspirin delayed-release 81 mg tablet Take 1 Tab by mouth two (2) times a day for 21 days. 19 Yes Sebastian Pavon PA   levothyroxine (SYNTHROID) 137 mcg tablet Take  by mouth Daily (before breakfast). Yes Provider, Historical   diltiazem (TIAZAC) 240 mg SR capsule Take 240 mg by mouth daily. Yes Provider, Historical   losartan (COZAAR) 50 mg tablet Take 50 mg by mouth daily. Yes Provider, Historical   multivitamin (ONE A DAY) tablet Take 1 Tab by mouth daily. Yes Provider, Historical   cephalexin (KEFLEX PO) Take  by mouth. Provider, Historical       FAMILY HISTORY: History reviewed. No pertinent family history. SOCIAL HISTORY:   Social History     Socioeconomic History    Marital status:      Spouse name: Not on file    Number of children: Not on file    Years of education: Not on file    Highest education level: Not on file   Tobacco Use    Smoking status: Former Smoker     Last attempt to quit: 1970     Years since quittin.0    Smokeless tobacco: Never Used   Substance and Sexual Activity    Alcohol use: No     Frequency: Never    Drug use: No       REVIEW OF SYSTEMS: All review of systems are negative. PHYSICAL EXAMINATION: For a detailed physical exam, please refer to the patient's chart. HOSPITAL COURSE: The patient was taken to surgery the day of admission. she underwent right total hip replacement via the anterior approach. Operative course was benign. Estimated blood loss approximately 300 cc. The patient was taken to the PACU in stable condition and was later taken to the floor in stable condition. Post-op Day #1, patient has done very well.  she has had little to no pain. she had been cleared by physical therapy with stair training. she was placed on Aspirin for DVT prophylaxis. her vitals have remained stable. she has also remained hemodynamically stable.  The patient has been recommended for discharge home. DISCHARGE INSTRUCTIONS: The patient is to be discharged home. Current Discharge Medication List      START taking these medications    Details   oxyCODONE-acetaminophen (PERCOCET) 5-325 mg per tablet Take 1-2 tablets by mouth every 4-6 hours as needed for pain. Qty: 60 Tab, Refills: 0    Associated Diagnoses: Primary osteoarthritis of right hip      meloxicam (MOBIC) 7.5 mg tablet Take 1 Tab by mouth two (2) times a day for 14 days. Qty: 28 Tab, Refills: 0      aspirin delayed-release 81 mg tablet Take 1 Tab by mouth two (2) times a day for 21 days. Qty: 42 Tab, Refills: 0         CONTINUE these medications which have NOT CHANGED    Details   levothyroxine (SYNTHROID) 137 mcg tablet Take  by mouth Daily (before breakfast). diltiazem (TIAZAC) 240 mg SR capsule Take 240 mg by mouth daily. losartan (COZAAR) 50 mg tablet Take 50 mg by mouth daily. multivitamin (ONE A DAY) tablet Take 1 Tab by mouth daily. STOP taking these medications       cephalexin (KEFLEX PO) Comments:   Reason for Stopping:                 The patient is to continue at home with home physical therapy 3 times a week to work on gait training, range of motion, strengthening, and weightbearing exercises as tolerated on her right lower extremity. The patient is to progress from a walker to a cane to complete total weightbearing as tolerable. The patient is to continue to keep her incision dry. The patient is to followup with Dr. Aaron Cooper, Gloria Slater PAMARYANA, and/or [facility] PAMARYANA in the office approximately 10-14 days status post for x-rays and further evaluation.       KAREN Villalobos  1/8/2019

## 2019-01-07 NOTE — PERIOP NOTES
Pt. Used restroom in pre-op area with assistance. Patient placed on Marcos Paws for a minimum of 30 min in  Preop.

## 2019-01-07 NOTE — ANESTHESIA PREPROCEDURE EVALUATION
Anesthetic History Comments: Slow emergence 20y ago Review of Systems / Medical History Patient summary reviewed, nursing notes reviewed and pertinent labs reviewed Pulmonary Within defined limits Neuro/Psych Within defined limits Cardiovascular Hypertension Exercise tolerance: >4 METS 
  
GI/Hepatic/Renal 
Within defined limits Endo/Other Hypothyroidism Arthritis Other Findings Physical Exam 
 
Airway Mallampati: III 
TM Distance: 4 - 6 cm Neck ROM: normal range of motion Mouth opening: Normal 
 
 Cardiovascular Regular rate and rhythm,  S1 and S2 normal,  no murmur, click, rub, or gallop Dental 
No notable dental hx Pulmonary Breath sounds clear to auscultation Abdominal 
GI exam deferred Other Findings Anesthetic Plan ASA: 2 Anesthesia type: general 
 
 
 
 
Induction: Intravenous Anesthetic plan and risks discussed with: Patient

## 2019-01-07 NOTE — PROGRESS NOTES
Problem: Falls - Risk of 
Goal: *Absence of Falls Document Bobbi Cunningham Fall Risk and appropriate interventions in the flowsheet. Outcome: Progressing Towards Goal 
Fall Risk Interventions: 
Mobility Interventions: Patient to call before getting OOB Medication Interventions: Patient to call before getting OOB Elimination Interventions: Call light in reach

## 2019-01-08 VITALS
HEART RATE: 62 BPM | DIASTOLIC BLOOD PRESSURE: 76 MMHG | WEIGHT: 252.31 LBS | BODY MASS INDEX: 38.24 KG/M2 | SYSTOLIC BLOOD PRESSURE: 144 MMHG | TEMPERATURE: 98.6 F | RESPIRATION RATE: 18 BRPM | OXYGEN SATURATION: 99 % | HEIGHT: 68 IN

## 2019-01-08 LAB
ANION GAP SERPL CALC-SCNC: 7 MMOL/L (ref 3–18)
BUN SERPL-MCNC: 14 MG/DL (ref 7–18)
BUN/CREAT SERPL: 17
CALCIUM SERPL-MCNC: 8 MG/DL (ref 8.5–10.1)
CHLORIDE SERPL-SCNC: 106 MMOL/L (ref 100–108)
CO2 SERPL-SCNC: 27 MMOL/L (ref 21–32)
CREAT SERPL-MCNC: 0.82 MG/DL (ref 0.6–1.3)
ERYTHROCYTE [DISTWIDTH] IN BLOOD BY AUTOMATED COUNT: 14.4 % (ref 11.6–14.5)
GLUCOSE SERPL-MCNC: 116 MG/DL (ref 74–99)
HCT VFR BLD AUTO: 29.8 % (ref 35–45)
HGB BLD-MCNC: 9.8 G/DL (ref 12–16)
MCH RBC QN AUTO: 29.7 PG (ref 24–34)
MCHC RBC AUTO-ENTMCNC: 32.9 G/DL (ref 31–37)
MCV RBC AUTO: 90.3 FL (ref 74–97)
PLATELET # BLD AUTO: 197 K/UL (ref 135–420)
PMV BLD AUTO: 9.5 FL (ref 9.2–11.8)
POTASSIUM SERPL-SCNC: 4.4 MMOL/L (ref 3.5–5.5)
RBC # BLD AUTO: 3.3 M/UL (ref 4.2–5.3)
SODIUM SERPL-SCNC: 140 MMOL/L (ref 136–145)
WBC # BLD AUTO: 15.1 K/UL (ref 4.6–13.2)

## 2019-01-08 PROCEDURE — 90686 IIV4 VACC NO PRSV 0.5 ML IM: CPT | Performed by: ORTHOPAEDIC SURGERY

## 2019-01-08 PROCEDURE — 97535 SELF CARE MNGMENT TRAINING: CPT

## 2019-01-08 PROCEDURE — 97116 GAIT TRAINING THERAPY: CPT

## 2019-01-08 PROCEDURE — 97165 OT EVAL LOW COMPLEX 30 MIN: CPT

## 2019-01-08 PROCEDURE — 74011250636 HC RX REV CODE- 250/636: Performed by: ORTHOPAEDIC SURGERY

## 2019-01-08 PROCEDURE — 74011250637 HC RX REV CODE- 250/637: Performed by: ORTHOPAEDIC SURGERY

## 2019-01-08 PROCEDURE — 90471 IMMUNIZATION ADMIN: CPT

## 2019-01-08 PROCEDURE — 85027 COMPLETE CBC AUTOMATED: CPT

## 2019-01-08 PROCEDURE — 80048 BASIC METABOLIC PNL TOTAL CA: CPT

## 2019-01-08 PROCEDURE — 74011250637 HC RX REV CODE- 250/637: Performed by: PHYSICIAN ASSISTANT

## 2019-01-08 PROCEDURE — 36415 COLL VENOUS BLD VENIPUNCTURE: CPT

## 2019-01-08 PROCEDURE — 74011250636 HC RX REV CODE- 250/636: Performed by: PHYSICIAN ASSISTANT

## 2019-01-08 RX ADMIN — MULTIPLE VITAMINS W/ MINERALS TAB 1 TABLET: TAB at 09:14

## 2019-01-08 RX ADMIN — FERROUS SULFATE TAB 325 MG (65 MG ELEMENTAL FE) 325 MG: 325 (65 FE) TAB at 09:17

## 2019-01-08 RX ADMIN — ASPIRIN 81 MG: 81 TABLET, COATED ORAL at 09:14

## 2019-01-08 RX ADMIN — INFLUENZA VIRUS VACCINE 0.5 ML: 15; 15; 15; 15 SUSPENSION INTRAMUSCULAR at 09:14

## 2019-01-08 RX ADMIN — ACETAMINOPHEN 650 MG: 325 TABLET ORAL at 12:46

## 2019-01-08 RX ADMIN — LEVOTHYROXINE SODIUM 137.5 MCG: 100 TABLET ORAL at 06:39

## 2019-01-08 RX ADMIN — LOSARTAN POTASSIUM 50 MG: 50 TABLET ORAL at 09:14

## 2019-01-08 RX ADMIN — PANTOPRAZOLE SODIUM 40 MG: 40 TABLET, DELAYED RELEASE ORAL at 09:15

## 2019-01-08 RX ADMIN — DILTIAZEM HYDROCHLORIDE 240 MG: 240 CAPSULE, COATED, EXTENDED RELEASE ORAL at 09:14

## 2019-01-08 RX ADMIN — OXYCODONE HYDROCHLORIDE 10 MG: 5 TABLET ORAL at 13:15

## 2019-01-08 RX ADMIN — DOCUSATE SODIUM 100 MG: 100 CAPSULE, LIQUID FILLED ORAL at 09:14

## 2019-01-08 RX ADMIN — KETOROLAC TROMETHAMINE 15 MG: 30 INJECTION, SOLUTION INTRAMUSCULAR at 06:07

## 2019-01-08 RX ADMIN — ACETAMINOPHEN 650 MG: 325 TABLET ORAL at 06:07

## 2019-01-08 RX ADMIN — OXYCODONE HYDROCHLORIDE 5 MG: 5 TABLET ORAL at 00:32

## 2019-01-08 RX ADMIN — Medication 2 G: at 00:32

## 2019-01-08 RX ADMIN — OXYCODONE HYDROCHLORIDE 10 MG: 5 TABLET ORAL at 04:10

## 2019-01-08 NOTE — ANESTHESIA POSTPROCEDURE EVALUATION
Post-Anesthesia Evaluation and Assessment Cardiovascular Function/Vital Signs Visit Vitals /72 Pulse 60 Temp 36.8 °C (98.3 °F) Resp 18 Ht 5' 7.5\" (1.715 m) Wt 114.4 kg (252 lb 5 oz) SpO2 96% Breastfeeding? No  
BMI 38.93 kg/m² Patient is status post Procedure(s): RIGHT TOTAL HIP REPLACEMENT ANTERIOR APPROACH WITH C-ARM. Nausea/Vomiting: Controlled. Postoperative hydration reviewed and adequate. Pain: 
Pain Scale 1: Numeric (0 - 10) (01/08/19 0752) Pain Intensity 1: 0 (01/08/19 0752) Managed. Neurological Status:  
Neuro (WDL): Exceptions to WDL (01/07/19 9742) At baseline. Mental Status and Level of Consciousness: Baseline and appropriate for discharge. Pulmonary Status:  
O2 Device: Room air (01/07/19 1405) Adequate oxygenation and airway patent. Complications related to anesthesia: None Post-anesthesia assessment completed. No concerns. Patient has met all discharge requirements. Signed By: Attila Velasquez MD  
 January 8, 2019

## 2019-01-08 NOTE — PROGRESS NOTES
Problem: Falls - Risk of 
Goal: *Absence of Falls Document Edelmira Banks Fall Risk and appropriate interventions in the flowsheet. Outcome: Progressing Towards Goal 
Fall Risk Interventions: 
Mobility Interventions: Patient to call before getting OOB, Strengthening exercises (ROM-active/passive), Utilize walker, cane, or other assistive device Medication Interventions: Patient to call before getting OOB, Teach patient to arise slowly Elimination Interventions: Call light in reach, Patient to call for help with toileting needs, Toileting schedule/hourly rounds, Toilet paper/wipes in reach

## 2019-01-08 NOTE — PROGRESS NOTES
1929 Assumed pt care at this time. Report received from Debbie Reynolds RN. Pt rating pain 5/10. Pt in chair with spouse at bedside. Call light within reach. Will continue to monitor. 2226 Assessment complete. Pt is alert and oriented x 4. Denies SOB and chest pain. Pt lungs clear bilaterally. Capillary refill less than 3 seconds. Pt denies numbness and tingling in all extremities. Stated pain 5/10. Pt has 18 G IV to left hand. Pt has mepilex dressing. SCD's and TEDs applied to BLE. Pt encouraged to continue use of IS. Pt verbalized understanding. Ice pack applied. Call light and possessions within reach. Bed in lowest position. Will continue to monitor. 0032 Pt rating pain 5/10. Administered 5 mg Roxicodone prn. Educated pt on side effects. 0410 Pt rating pain 7/10. Administered 5 mg Roxicodone prn. Educated pt on side effects. 0640 Pt up in chair. Stated she feels dizzy, but refuses getting back in bed. Stated she does not feel like she will pass out. Shift summary: Pt is alert and oriented x 4. Pt had an uneventful shift. Pt ambulating and voiding sufficient amounts. Pain controlled by PRN medication.

## 2019-01-08 NOTE — PROGRESS NOTES
2813 
Pt is awake, alert, oriented x 4. Sitting on chair. Pain level 4/10. Ice pack applied to right hip. Seen by Dr Markell Orantes. Pt for discharge today. 9:24 AM 
Sitting on chair. Lungs are clear. Abdomen obese with active BS. Pt given Flu vaccine. Pain level 4/10 and comfortable. 11:13 AM 
 Pt was seen by PT. Pt ambulated in hallway and stairs. Pt was cleared by PT for discharge. [address] 28 Discharge instructions given by Rosalina Brown RN. 
5952 Pt discharged per wheelchair accompanied by family.

## 2019-01-08 NOTE — ROUTINE PROCESS
1930 
Bedside and Verbal shift change report given to Kierra Bell RN (oncoming nurse) by Himanshu Ryder RN (offgoing nurse). Report included the following information SBAR, Kardex and MAR.

## 2019-01-08 NOTE — PROGRESS NOTES
Transition of Care (KACIE) Plan:     Pt plans discharge home with spouse available to assist. Verified discharge address- different from account address. FOC offered, pt chose [place] [phone] for follow up; referral placed with CMS. Pt has RW for home. KACIE Transportation: How is patient being transported at discharge? Family/Friend When? Once cleared by Therapy between 12-2pm 
 
 Is transport scheduled? N/A Follow-up appointment and transportation: PCP/Specialist?  See AVS for Appointment Who is transporting to the follow-up appointment? Family/Friend Is transport for follow up appointment scheduled? N/A Communication plan (with patient/family): Who is being called? Patient or Next of Kin? Responsible party? Patient What number(s) is to be used? See [place] What service provider is calling for [facility] services? When are they calling? 24-48 hours following discharge Readmission Risk? (Green/Low; Yellow/Moderate; Red/High):  Kaylie Farrar Care Management Interventions PCP Verified by CM: Yes Transition of Care Consult (CM Consult): Home Health [address] [place] [address] Road: No 
Reason Outside [place] [phone] Chosen: Out of service area([facility]) Discharge Durable Medical Equipment: No 
Physical Therapy Consult: Yes Occupational Therapy Consult: Yes Current Support Network: Lives with Spouse Confirm Follow Up Transport: Family Plan discussed with Pt/Family/Caregiver: Yes Freedom of Choice Offered: Yes Discharge Location Discharge Placement: Home with home health

## 2019-01-08 NOTE — PROGRESS NOTES
Patient was visited by TYRELL. Volunteer followed up with patient and/or family and reported no needs to this . Chaplains will continue to follow and will provide pastoral care as needed or requested. Rev. Mauricio Cee  Spiritual Care 
[phone]

## 2019-01-08 NOTE — ROUTINE PROCESS
Dual AVS reviewed with Marissa Bautista RN. All medications reviewed individually with patient. Opportunities for questions and concerns provided. Patient discharged via (mode of transport ie. Car, ambulance or air transport) car. Patient's arm band appropriately discarded.

## 2019-01-08 NOTE — ROUTINE PROCESS
Bedside and verbal shift change report given to Venancio Latham RN (oncoming nurse) by Trung Sanchez RN (offgoing nurse). Report included the following information: SBAR, Kardex, MAR, and recent results.

## 2019-01-08 NOTE — PROGRESS NOTES
Problem: Self Care Deficits Care Plan (Adult) Goal: *Acute Goals and Plan of Care (Insert Text) Outcome: Resolved/Met Date Met: 01/08/19 Occupational Therapy EVALUATION/discharge Patient: Nhan Glover [de-identified]74 y.o. female) Date: 1/8/2019 Primary Diagnosis: UNILATERAL PRIMARY OSTEOARTHRITIS, RIGHT HIP Osteoarthritis of right hip Procedure(s) (LRB): 
RIGHT TOTAL HIP REPLACEMENT ANTERIOR APPROACH WITH C-ARM (Right) 1 Day Post-Op Precautions:   Fall, WBAT 
 
ASSESSMENT AND RECOMMENDATIONS: 
Based on the objective data described below, the patient presents with ability to perform ADL tasks at baseline level. Pt was sitting in chair upon arrival; pleasant and cooperative. Pt performed UB/LB dressing with Setup/S using adaptive strategy and extended time. Pt performed functional transfers and mobility within room/bathroom with RW and S. Pt was able to stand at sink with RW and S to complete grooming tasks. Pt was educated and verbalized understanding of home safety. Pt's spouse will be available to assist as needed. Pt was left seated in chair call bell and table tray within reach, and all needs met. Pt with no further OT/ADL concerns at this time. Skilled occupational therapy is not indicated at this time. Education: Reviewed home safety, body mechanics, importance of moving every hour to prevent joint stiffness, role of ice for edema/pain control, Rolling Walker management/safety, and adaptive dressing techniques with patient verbalizing  understanding at this time Discharge Recommendations: none Further Equipment Recommendations for Discharge: none SUBJECTIVE:  
Patient stated I am doing okay.  OBJECTIVE DATA SUMMARY:  
 
Past Medical History:  
Diagnosis Date  Adverse effect of anesthesia   
 difficult to wake up  Hypertension 1993  Localized osteoarthrosis of right hip 1/6/2019  Neuropathy   
 toes  OA (osteoarthritis)  Thyroid disease   
 hypo Past Surgical History:  
Procedure Laterality Date  HX GI  before 2003  
 colonoscopy  HX GI    
 ERCP  
 HX LAP CHOLECYSTECTOMY  HX TONSILLECTOMY  HX TUBAL LIGATION Bilateral   
 
Barriers to Learning/Limitations: None Compensate with: visual, verbal, tactile, kinesthetic cues/model Prior Level of Function/Home Situation: Pt was Ind with ADLs/IADLs prior. Home Situation Home Environment: Private residence # Steps to Enter: 2 Rails to Enter: No 
One/Two Story Residence: One story # of Interior Steps: 12 Height of Each Step (in): 8 inches Interior Rails: Right Lift Chair Available: No 
Living Alone: No 
Support Systems: Family member(s), Spouse/Significant Other/Partner Patient Expects to be Discharged to[de-identified] Private residence Current DME Used/Available at Home: Walker, rolling, Raised toilet seat Tub or Shower Type: Shower Cognitive/Behavioral Status: 
Neurologic State: Alert; Appropriate for age Orientation Level: Appropriate for age;Oriented X4 Cognition: Appropriate decision making; Appropriate for age attention/concentration; Appropriate safety awareness; Follows commands Safety/Judgement: Awareness of environment; Fall prevention;Good awareness of safety precautions; Home safety Coordination: 
Coordination: Generally decreased, functional 
Fine Motor Skills-Upper: Left Intact; Right Intact Gross Motor Skills-Upper: Left Intact; Right Intact Balance: 
Sitting: Intact Standing: Intact; With support Strength: BUE Strength: Generally decreased, functional 
   
Tone & Sensation: BUE Tone: Normal 
   
Range of Motion: BUE 
AROM: Generally decreased, functional 
 Functional Mobility and Transfers for ADLs: 
Bed Mobility: 
 Sit to Supine: Supervision Transfers: 
Sit to Stand: Supervision Bathroom Mobility: Supervision/set up ADL Assessment: 
Feeding: Independent Oral Facial Hygiene/Grooming: Supervision Upper Body Dressing: Setup;Supervision Lower Body Dressing: Setup;Supervision ADL Intervention: 
Feeding Feeding Assistance: Independent Container Management: Independent Cutting Food: Independent Utensil Management: Independent Food to Mouth: Independent Drink to Mouth: Independent Grooming Grooming Assistance: Supervision/set up(standing at sink with RW) Washing Face: Supervision/set-up Washing Hands: Supervision/set-up Brushing Teeth: Supervision/set-up Applying Makeup: Supervision/set-up Brushing/Combing Hair: Supervision/set-up Upper Body Dressing Assistance Dressing Assistance: Supervision/set-up(sitting in chair for safety) Pullover Shirt: Supervision/set-up Lower Body Dressing Assistance Dressing Assistance: Supervision/set up Underpants: Supervision/set-up; Compensatory technique training Pants With Button/Zipper: Supervision/set-up; Compensatory technique training Position Performed: Bending forward method;Seated in chair Cues: Don;Verbal cues provided Adaptive Equipment Used: Glenn Bennett Toileting Toileting Assistance: Supervision/set up Cognitive Retraining Safety/Judgement: Awareness of environment; Fall prevention;Good awareness of safety precautions; Home safety Pain: 
Pre-treatment: 4/10 Post-treatment: 5/10 Activity Tolerance:  
Pt overall tolerated session well with no signs of fatigue or distress. Please refer to the flowsheet for vital signs taken during this treatment. After treatment:  
[x]  Patient left in no apparent distress sitting up in chair 
[]  Patient left in no apparent distress in bed 
[x]  Call bell left within reach 
[]  Nursing notified 
[]  Caregiver present 
[]  Bed alarm activated COMMUNICATION/EDUCATION:  
Communication/Collaboration: 
[x]      Home safety education was provided and the patient/caregiver indicated understanding. [x]      Patient/family have participated as able and agree with findings and recommendations. []      Patient is unable to participate in plan of care at this time. Thank you for this referral. 
Oliva Rodgers, OTR/L Time Calculation: 29 mins G-Codes (GP) Self Care  Current  CI= 1-19%  Goal  CI= 1-19%  D/C  CI= 1-19% The severity rating is based on the professional judgement & direct observation of Level of Assistance required for Functional Mobility and ADLs. Eval Complexity: History: MEDIUM Complexity : Expanded review of history including physical, cognitive and psychosocial  history ; Examination: LOW Complexity : 1-3 performance deficits relating to physical, cognitive , or psychosocial skils that result in activity limitations and / or participation restrictions ; Decision Making:LOW Complexity : No comorbidities that affect functional and no verbal or physical assistance needed to complete eval tasks

## 2019-01-08 NOTE — PROGRESS NOTES
Progress Note Patient: Juan C Mtz MRN: [id number]  SSN: [id number] YOB: 1945  Age: 74 y.o. Sex: female 1 Day Post-Op status post Procedure(s) (LRB): 
RIGHT TOTAL HIP REPLACEMENT ANTERIOR APPROACH WITH C-ARM (Right) Admit Date: 2019 Admit Diagnosis: UNILATERAL PRIMARY OSTEOARTHRITIS, RIGHT HIP Osteoarthritis of right hip Subjective:   
 
Doing well. No complaints. No SOB. No Chest Pain. No Nausea or Vomiting. No problems eating or voiding. Objective:  
  
 
Temp (24hrs), Av.5 °F (36.4 °C), Min:97.2 °F (36.2 °C), Max:97.8 °F (36.6 °C) Body mass index is 38.93 kg/m². Patient Vitals for the past 12 hrs: 
 BP Temp Pulse Resp SpO2  
19 0355 148/73 97.6 °F (36.4 °C) 66 16 94 % 19 2335 (!) 147/91 97.4 °F (36.3 °C) 64 16 100 % 19 1941 159/74 97.8 °F (36.6 °C) 61 16 95 % Recent Labs 19 
7283 HGB 9.8* HCT 29.8*   
K 4.4  
 CO2 27 BUN 14  
CREA 0.82 * Physical Exam: 
Vital Signs are Stable. No Acute Distress. Alert and Oriented. Negative Homans sign. Toes AROM Full. Neurovascular exam is normal.   
Dressing is Clean, Dry, and Intact. Assessment/Plan:  
 
Stable s/p thr 
oob with rehab 
1. D/c planning Continue PT/OT Discharge Plan: Home with Home Health Signed By: Alvina Lay MD   
 2019

## 2019-01-08 NOTE — PROGRESS NOTES
Problem: Mobility Impaired (Adult and Pediatric) Goal: *Acute Goals and Plan of Care (Insert Text) Physical Therapy Goals Initiated 1/7/2019 and to be accomplished within 3-5 day(s) 1. Patient will move from supine <> sit with S in prep for out of bed activity and change of position. 2.  Patient will perform sit<> stand with S with LRAD in prep for transfers/ambulation. 3.  Patient will transfer from bed <> chair with S with LRAD for time up in chair for completion of ADL activity. 4.  Patient will ambulate 150 feet with LRAD/S for improved functional mobility/safe discharge. 5.  Patient will ascend/descend 3-5 stairs with minimal assistance/contact guard assist for home re-entry as needed. Outcome: Resolved/Met Date Met: 01/08/19 
physical Therapy TREATMENT/DISCHARGE Patient: Lizbeth Montes [de-identified]74 y.o. female) Date: 1/8/2019 Diagnosis: UNILATERAL PRIMARY OSTEOARTHRITIS, RIGHT HIP Osteoarthritis of right hip Localized osteoarthrosis of right hip Procedure(s) (LRB): 
RIGHT TOTAL HIP REPLACEMENT ANTERIOR APPROACH WITH C-ARM (Right) 1 Day Post-Op Precautions: Fall, WBAT Chart, physical therapy assessment, plan of care and goals were reviewed. ASSESSMENT: 
Pt sitting in chair upon entering room. No difficulty with sit to stand. Ambulated 220ft with RW, reciprocal gt pattern, decreased step height and length, no LOB or path deviations. Negotiated 2 steps holding (B) hand rails. Increased time and supervision to return to supine in bed. Reviewed HEP and performed supine ROM strengthening exercises. EDUCATION stair nego tech, HEP 3x/day, ambulate hourly as tolerated Progression toward goals: 
[x]      Goals met 
[]      Improving appropriately and progressing toward goals 
[]      Improving slowly and progressing toward goals 
[]      Not making progress toward goals and plan of care will be adjusted PLAN: 
Patient will be discharged from physical therapy at this time. Rationale for discharge: 
[x] Goals Achieved 
[] [address] 
[] Patient not participating in therapy 
[] Other: 
Discharge Recommendations:  [facility] Further Equipment Recommendations for Discharge:  Has a RW SUBJECTIVE:  
Patient stated ok.  OBJECTIVE DATA SUMMARY:  
 
G CODES: Mobility  [id number] Goal  CI= 1-19%  D/C  CI= 1-19%. The severity rating is based on the Level of Assistance required for Functional Mobility and ADLs. Critical Behavior: 
Neurologic State: Alert, Appropriate for age Orientation Level: Oriented X4 Functional Mobility Training: 
Bed Mobility: 
Sit to Supine: Supervision Transfers: 
Sit to Stand: Supervision Stand to Sit: Supervision Balance: 
Sitting: Intact Standing: Intact; With supportAmbulation/Gait Training: 
Distance (ft): 220 Feet (ft) Assistive Device: Gait belt;Walker, rolling Ambulation - Level of Assistance: Supervision Gait Abnormalities: Antalgic Right Side Weight Bearing: As tolerated Speed/Adriana: Slow Stairs: 
Number of Stairs Trained: 2 Stairs - Level of Assistance: Supervision Rail Use: Both Therapeutic Exercises:  
Supine: QS, HS, SAQ Pain: 
Pre treatment pain:  4 Post treatment pain:  5 Pain Scale 1: Numeric (0 - 10) Activity Tolerance:  
Good Please refer to the flowsheet for vital signs taken during this treatment. After treatment:  
[] Patient left in no apparent distress sitting up in chair 
[x] Patient left in no apparent distress in bed 
[x] Call bell left within reach [x] Nursing notified 
[] Caregiver present 
[] Bed alarm activated Stephan Katz PTA Time Calculation: 23 mins
